# Patient Record
Sex: MALE | Race: WHITE | NOT HISPANIC OR LATINO | Employment: OTHER | URBAN - METROPOLITAN AREA
[De-identification: names, ages, dates, MRNs, and addresses within clinical notes are randomized per-mention and may not be internally consistent; named-entity substitution may affect disease eponyms.]

---

## 2021-10-14 ENCOUNTER — APPOINTMENT (OUTPATIENT)
Dept: PREADMISSION TESTING | Facility: HOSPITAL | Age: 76
End: 2021-10-14

## 2021-11-08 ENCOUNTER — PRE-ADMISSION TESTING (OUTPATIENT)
Dept: PREADMISSION TESTING | Facility: HOSPITAL | Age: 76
End: 2021-11-08

## 2021-11-08 ENCOUNTER — HOSPITAL ENCOUNTER (OUTPATIENT)
Dept: GENERAL RADIOLOGY | Facility: HOSPITAL | Age: 76
Discharge: HOME | End: 2021-11-08

## 2021-11-08 VITALS
HEART RATE: 61 BPM | HEIGHT: 70 IN | BODY MASS INDEX: 26.92 KG/M2 | DIASTOLIC BLOOD PRESSURE: 74 MMHG | TEMPERATURE: 97.9 F | WEIGHT: 188 LBS | SYSTOLIC BLOOD PRESSURE: 155 MMHG | OXYGEN SATURATION: 100 % | RESPIRATION RATE: 20 BRPM

## 2021-11-08 LAB
APTT PPP: 31.7 SECONDS (ref 22.7–35.4)
BILIRUB UR QL STRIP: NEGATIVE
CLARITY UR: CLEAR
COLOR UR: YELLOW
DEPRECATED RDW RBC AUTO: 45 FL (ref 37–54)
ERYTHROCYTE [DISTWIDTH] IN BLOOD BY AUTOMATED COUNT: 13.3 % (ref 12.3–15.4)
GLUCOSE UR STRIP-MCNC: NEGATIVE MG/DL
HCT VFR BLD AUTO: 43.8 % (ref 37.5–51)
HGB BLD-MCNC: 14.4 G/DL (ref 13–17.7)
HGB UR QL STRIP.AUTO: NEGATIVE
INR PPP: 1.07 (ref 0.9–1.1)
KETONES UR QL STRIP: NEGATIVE
LEUKOCYTE ESTERASE UR QL STRIP.AUTO: NEGATIVE
MCH RBC QN AUTO: 30.2 PG (ref 26.6–33)
MCHC RBC AUTO-ENTMCNC: 32.9 G/DL (ref 31.5–35.7)
MCV RBC AUTO: 91.8 FL (ref 79–97)
NITRITE UR QL STRIP: NEGATIVE
PH UR STRIP.AUTO: 5.5 [PH] (ref 5–8)
PLATELET # BLD AUTO: 223 10*3/MM3 (ref 140–450)
PMV BLD AUTO: 10.2 FL (ref 6–12)
PROT UR QL STRIP: NEGATIVE
PROTHROMBIN TIME: 13.8 SECONDS (ref 11.7–14.2)
QT INTERVAL: 401 MS
RBC # BLD AUTO: 4.77 10*6/MM3 (ref 4.14–5.8)
SARS-COV-2 ORF1AB RESP QL NAA+PROBE: NOT DETECTED
SP GR UR STRIP: 1.02 (ref 1–1.03)
UROBILINOGEN UR QL STRIP: NORMAL
WBC # BLD AUTO: 4.85 10*3/MM3 (ref 3.4–10.8)

## 2021-11-08 PROCEDURE — 85730 THROMBOPLASTIN TIME PARTIAL: CPT

## 2021-11-08 PROCEDURE — U0004 COV-19 TEST NON-CDC HGH THRU: HCPCS | Performed by: NURSE PRACTITIONER

## 2021-11-08 PROCEDURE — 93005 ELECTROCARDIOGRAM TRACING: CPT

## 2021-11-08 PROCEDURE — C9803 HOPD COVID-19 SPEC COLLECT: HCPCS | Performed by: NURSE PRACTITIONER

## 2021-11-08 PROCEDURE — 85610 PROTHROMBIN TIME: CPT

## 2021-11-08 PROCEDURE — 71046 X-RAY EXAM CHEST 2 VIEWS: CPT

## 2021-11-08 PROCEDURE — 85027 COMPLETE CBC AUTOMATED: CPT

## 2021-11-08 PROCEDURE — 36415 COLL VENOUS BLD VENIPUNCTURE: CPT

## 2021-11-08 PROCEDURE — 81003 URINALYSIS AUTO W/O SCOPE: CPT

## 2021-11-08 PROCEDURE — 93010 ELECTROCARDIOGRAM REPORT: CPT | Performed by: INTERNAL MEDICINE

## 2021-11-08 RX ORDER — MELOXICAM 15 MG/1
15 TABLET ORAL AS NEEDED
COMMUNITY

## 2021-11-08 RX ORDER — CETIRIZINE HYDROCHLORIDE 5 MG/1
5 TABLET ORAL AS NEEDED
COMMUNITY

## 2021-11-08 RX ORDER — GENTAMICIN SULFATE 3 MG/ML
3 SOLUTION/ DROPS OPHTHALMIC 2 TIMES DAILY
COMMUNITY
Start: 2021-10-11

## 2021-11-08 RX ORDER — ASPIRIN 81 MG/1
81 TABLET ORAL NIGHTLY
COMMUNITY

## 2021-11-08 RX ORDER — SIMVASTATIN 40 MG
40 TABLET ORAL NIGHTLY
COMMUNITY

## 2021-11-08 NOTE — DISCHARGE INSTRUCTIONS
Take the following medications the morning of surgery:  NONE    ARRIVE TO OUTPATIENT SURGERY 11-10-21 AT 9:00AM      If you are on prescription narcotic pain medication to control your pain you may also take that medication the morning of surgery.    General Instructions:  • Do not eat solid food after midnight the night before surgery.  • You may drink clear liquids day of surgery but must stop at least one hour before your hospital arrival time.  • It is beneficial for you to have a clear drink that contains carbohydrates the day of surgery.  We suggest a 12 to 20 ounce bottle of Gatorade or Powerade for non-diabetic patients or a 12 to 20 ounce bottle of G2 or Powerade Zero for diabetic patients. (Pediatric patients, are not advised to drink a 12 to 20 ounce carbohydrate drink)    Clear liquids are liquids you can see through.  Nothing red in color.     Plain water                               Sports drinks  Sodas                                   Gelatin (Jell-O)  Fruit juices without pulp such as white grape juice and apple juice  Popsicles that contain no fruit or yogurt  Tea or coffee (no cream or milk added)  Gatorade / Powerade  G2 / Powerade Zero    • Infants may have breast milk up to four hours before surgery.  • Infants drinking formula may drink formula up to six hours before surgery.   • Patients who avoid smoking, chewing tobacco and alcohol for 4 weeks prior to surgery have a reduced risk of post-operative complications.  Quit smoking as many days before surgery as you can.  • Do not smoke, use chewing tobacco or drink alcohol the day of surgery.   • If applicable bring your C-PAP/ BI-PAP machine.  • Bring any papers given to you in the doctor’s office.  • Wear clean comfortable clothes.  • Do not wear contact lenses, false eyelashes or make-up.  Bring a case for your glasses.   • Bring crutches or walker if applicable.  • Remove all piercings.  Leave jewelry and any other valuables at  home.  • Hair extensions with metal clips must be removed prior to surgery.  • The Pre-Admission Testing nurse will instruct you to bring medications if unable to obtain an accurate list in Pre-Admission Testing.      Preventing a Surgical Site Infection:  • For 2 to 3 days before surgery, avoid shaving with a razor because the razor can irritate skin and make it easier to develop an infection.    • Any areas of open skin can increase the risk of a post-operative wound infection by allowing bacteria to enter and travel throughout the body.  Notify your surgeon if you have any skin wounds / rashes even if it is not near the expected surgical site.  The area will need assessed to determine if surgery should be delayed until it is healed.  • The night prior to surgery shower using a fresh bar of anti-bacterial soap (such as Dial) and clean washcloth.  Sleep in a clean bed with clean clothing.  Do not allow pets to sleep with you.  • Shower on the morning of surgery using a fresh bar of anti-bacterial soap (such as Dial) and clean washcloth.  Dry with a clean towel and dress in clean clothing.  • Ask your surgeon if you will be receiving antibiotics prior to surgery.  • Make sure you, your family, and all healthcare providers clean their hands with soap and water or an alcohol based hand  before caring for you or your wound.    Day of surgery:  Your arrival time is approximately two hours before your scheduled surgery time.  Upon arrival, a Pre-op nurse and Anesthesiologist will review your health history, obtain vital signs, and answer questions you may have.  The only belongings needed at this time will be a list of your home medications and if applicable your C-PAP/BI-PAP machine.  A Pre-op nurse will start an IV and you may receive medication in preparation for surgery, including something to help you relax.     Please be aware that surgery does come with discomfort.  We want to make every effort to control  your discomfort so please discuss any uncontrolled symptoms with your nurse.   Your doctor will most likely have prescribed pain medications.      If you are going home after surgery you will receive individualized written care instructions before being discharged.  A responsible adult must drive you to and from the hospital on the day of your surgery and stay with you for 24 hours.  Discharge prescriptions can be filled by the hospital pharmacy during regular pharmacy hours.  If you are having surgery late in the day/evening your prescription may be e-prescribed to your pharmacy.  Please verify your pharmacy hours or chose a 24 hour pharmacy to avoid not having access to your prescription because your pharmacy has closed for the day.    If you are staying overnight following surgery, you will be transported to your hospital room following the recovery period.  [facility] has all private rooms.    If you have any questions please call Pre-Admission Testing at [phone].  Deductibles and co-payments are collected on the day of service. Please be prepared to pay the required co-pay, deductible or deposit on the day of service as defined by your plan.    Patient Education for Self-Quarantine Process    • Following your COVID testing, we strongly recommend that you wear a mask when you are with other people and practice social distancing.   • Limit your activities to only required outings.  • Wash your hands with soap and water frequently for at least 20 seconds.   • Avoid touching your eyes, nose and mouth with unwashed hands.  • Do not share anything - utensils, drinking glasses, food from the same bowl.   • Sanitize household surfaces daily. Include all high touch areas (door handles, light switches, phones, countertops, etc.)    Call your surgeon immediately if you experience any of the following symptoms:  • Sore Throat  • Shortness of Breath or difficulty breathing  • Cough  • Chills  • Body  soreness or muscle pain  • Headache  • Fever  • New loss of taste or smell  • Do not arrive for your surgery ill.  Your procedure will need to be rescheduled to another time.  You will need to call your physician before the day of surgery to avoid any unnecessary exposure to hospital staff as well as other patients.

## 2021-11-10 ENCOUNTER — HOSPITAL ENCOUNTER (OUTPATIENT)
Facility: HOSPITAL | Age: 76
Setting detail: HOSPITAL OUTPATIENT SURGERY
Discharge: HOME | End: 2021-11-10
Attending: OTOLARYNGOLOGY | Admitting: OTOLARYNGOLOGY

## 2021-11-10 ENCOUNTER — ANESTHESIA (OUTPATIENT)
Dept: PERIOP | Facility: HOSPITAL | Age: 76
End: 2021-11-10

## 2021-11-10 ENCOUNTER — ANESTHESIA EVENT (OUTPATIENT)
Dept: PERIOP | Facility: HOSPITAL | Age: 76
End: 2021-11-10

## 2021-11-10 VITALS
TEMPERATURE: 97.4 F | HEART RATE: 67 BPM | OXYGEN SATURATION: 98 % | DIASTOLIC BLOOD PRESSURE: 70 MMHG | SYSTOLIC BLOOD PRESSURE: 140 MMHG | RESPIRATION RATE: 16 BRPM

## 2021-11-10 DIAGNOSIS — H61.21 EXCESSIVE EAR WAX, RIGHT: ICD-10-CM

## 2021-11-10 PROCEDURE — 87070 CULTURE OTHR SPECIMN AEROBIC: CPT | Performed by: OTOLARYNGOLOGY

## 2021-11-10 PROCEDURE — 25010000002 ONDANSETRON PER 1 MG: Performed by: NURSE ANESTHETIST, CERTIFIED REGISTERED

## 2021-11-10 PROCEDURE — 0 EPINEPHRINE 1 MG/ML SOLUTION: Performed by: OTOLARYNGOLOGY

## 2021-11-10 PROCEDURE — 87075 CULTR BACTERIA EXCEPT BLOOD: CPT | Performed by: OTOLARYNGOLOGY

## 2021-11-10 PROCEDURE — 25010000002 SUCCINYLCHOLINE PER 20 MG: Performed by: NURSE ANESTHETIST, CERTIFIED REGISTERED

## 2021-11-10 PROCEDURE — 25010000002 PROPOFOL 10 MG/ML EMULSION: Performed by: NURSE ANESTHETIST, CERTIFIED REGISTERED

## 2021-11-10 PROCEDURE — 25010000002 FENTANYL CITRATE (PF) 50 MCG/ML SOLUTION: Performed by: NURSE ANESTHETIST, CERTIFIED REGISTERED

## 2021-11-10 PROCEDURE — 87205 SMEAR GRAM STAIN: CPT | Performed by: OTOLARYNGOLOGY

## 2021-11-10 PROCEDURE — 25010000002 DEXAMETHASONE PER 1 MG: Performed by: NURSE ANESTHETIST, CERTIFIED REGISTERED

## 2021-11-10 RX ORDER — PROPOFOL 10 MG/ML
VIAL (ML) INTRAVENOUS AS NEEDED
Status: DISCONTINUED | OUTPATIENT
Start: 2021-11-10 | End: 2021-11-10 | Stop reason: SURG

## 2021-11-10 RX ORDER — SODIUM CHLORIDE, SODIUM LACTATE, POTASSIUM CHLORIDE, CALCIUM CHLORIDE 600; 310; 30; 20 MG/100ML; MG/100ML; MG/100ML; MG/100ML
9 INJECTION, SOLUTION INTRAVENOUS CONTINUOUS
Status: DISCONTINUED | OUTPATIENT
Start: 2021-11-10 | End: 2021-11-10 | Stop reason: HOSPADM

## 2021-11-10 RX ORDER — HYDROMORPHONE HYDROCHLORIDE 1 MG/ML
0.5 INJECTION, SOLUTION INTRAMUSCULAR; INTRAVENOUS; SUBCUTANEOUS
Status: DISCONTINUED | OUTPATIENT
Start: 2021-11-10 | End: 2021-11-10 | Stop reason: HOSPADM

## 2021-11-10 RX ORDER — FENTANYL CITRATE 50 UG/ML
INJECTION, SOLUTION INTRAMUSCULAR; INTRAVENOUS AS NEEDED
Status: DISCONTINUED | OUTPATIENT
Start: 2021-11-10 | End: 2021-11-10 | Stop reason: SURG

## 2021-11-10 RX ORDER — ONDANSETRON 2 MG/ML
INJECTION INTRAMUSCULAR; INTRAVENOUS AS NEEDED
Status: DISCONTINUED | OUTPATIENT
Start: 2021-11-10 | End: 2021-11-10 | Stop reason: SURG

## 2021-11-10 RX ORDER — OXYCODONE AND ACETAMINOPHEN 10; 325 MG/1; MG/1
1 TABLET ORAL EVERY 4 HOURS PRN
Status: DISCONTINUED | OUTPATIENT
Start: 2021-11-10 | End: 2021-11-10 | Stop reason: HOSPADM

## 2021-11-10 RX ORDER — EPHEDRINE SULFATE 50 MG/ML
INJECTION, SOLUTION INTRAVENOUS AS NEEDED
Status: DISCONTINUED | OUTPATIENT
Start: 2021-11-10 | End: 2021-11-10 | Stop reason: SURG

## 2021-11-10 RX ORDER — HYDROCODONE BITARTRATE AND ACETAMINOPHEN 7.5; 325 MG/1; MG/1
1 TABLET ORAL ONCE AS NEEDED
Status: DISCONTINUED | OUTPATIENT
Start: 2021-11-10 | End: 2021-11-10 | Stop reason: HOSPADM

## 2021-11-10 RX ORDER — DIPHENHYDRAMINE HCL 25 MG
25 CAPSULE ORAL
Status: DISCONTINUED | OUTPATIENT
Start: 2021-11-10 | End: 2021-11-10 | Stop reason: HOSPADM

## 2021-11-10 RX ORDER — PROMETHAZINE HYDROCHLORIDE 25 MG/1
25 SUPPOSITORY RECTAL ONCE AS NEEDED
Status: DISCONTINUED | OUTPATIENT
Start: 2021-11-10 | End: 2021-11-10 | Stop reason: HOSPADM

## 2021-11-10 RX ORDER — MAGNESIUM HYDROXIDE 1200 MG/15ML
LIQUID ORAL AS NEEDED
Status: DISCONTINUED | OUTPATIENT
Start: 2021-11-10 | End: 2021-11-10 | Stop reason: HOSPADM

## 2021-11-10 RX ORDER — LIDOCAINE HYDROCHLORIDE 20 MG/ML
INJECTION, SOLUTION INFILTRATION; PERINEURAL AS NEEDED
Status: DISCONTINUED | OUTPATIENT
Start: 2021-11-10 | End: 2021-11-10 | Stop reason: SURG

## 2021-11-10 RX ORDER — EPHEDRINE SULFATE 50 MG/ML
5 INJECTION, SOLUTION INTRAVENOUS ONCE AS NEEDED
Status: DISCONTINUED | OUTPATIENT
Start: 2021-11-10 | End: 2021-11-10 | Stop reason: HOSPADM

## 2021-11-10 RX ORDER — LABETALOL HYDROCHLORIDE 5 MG/ML
5 INJECTION, SOLUTION INTRAVENOUS
Status: DISCONTINUED | OUTPATIENT
Start: 2021-11-10 | End: 2021-11-10 | Stop reason: HOSPADM

## 2021-11-10 RX ORDER — HYDROCODONE BITARTRATE AND ACETAMINOPHEN 5; 325 MG/1; MG/1
1 TABLET ORAL ONCE AS NEEDED
Status: DISCONTINUED | OUTPATIENT
Start: 2021-11-10 | End: 2021-11-10 | Stop reason: HOSPADM

## 2021-11-10 RX ORDER — ONDANSETRON 2 MG/ML
4 INJECTION INTRAMUSCULAR; INTRAVENOUS ONCE AS NEEDED
Status: DISCONTINUED | OUTPATIENT
Start: 2021-11-10 | End: 2021-11-10 | Stop reason: HOSPADM

## 2021-11-10 RX ORDER — ACETAMINOPHEN 650 MG/1
650 SUPPOSITORY RECTAL ONCE AS NEEDED
Status: DISCONTINUED | OUTPATIENT
Start: 2021-11-10 | End: 2021-11-10 | Stop reason: HOSPADM

## 2021-11-10 RX ORDER — SODIUM CHLORIDE 0.9 % (FLUSH) 0.9 %
3 SYRINGE (ML) INJECTION EVERY 12 HOURS SCHEDULED
Status: DISCONTINUED | OUTPATIENT
Start: 2021-11-10 | End: 2021-11-10 | Stop reason: HOSPADM

## 2021-11-10 RX ORDER — SULFAMETHOXAZOLE AND TRIMETHOPRIM 800; 160 MG/1; MG/1
1 TABLET ORAL 2 TIMES DAILY
Qty: 20 TABLET | Refills: 0 | Status: SHIPPED | OUTPATIENT
Start: 2021-11-10 | End: 2021-11-20

## 2021-11-10 RX ORDER — SODIUM CHLORIDE 0.9 % (FLUSH) 0.9 %
3-10 SYRINGE (ML) INJECTION AS NEEDED
Status: DISCONTINUED | OUTPATIENT
Start: 2021-11-10 | End: 2021-11-10 | Stop reason: HOSPADM

## 2021-11-10 RX ORDER — PROMETHAZINE HYDROCHLORIDE 25 MG/1
25 TABLET ORAL ONCE AS NEEDED
Status: DISCONTINUED | OUTPATIENT
Start: 2021-11-10 | End: 2021-11-10 | Stop reason: HOSPADM

## 2021-11-10 RX ORDER — LIDOCAINE HYDROCHLORIDE 10 MG/ML
0.5 INJECTION, SOLUTION EPIDURAL; INFILTRATION; INTRACAUDAL; PERINEURAL ONCE AS NEEDED
Status: COMPLETED | OUTPATIENT
Start: 2021-11-10 | End: 2021-11-10

## 2021-11-10 RX ORDER — DIPHENHYDRAMINE HYDROCHLORIDE 50 MG/ML
12.5 INJECTION INTRAMUSCULAR; INTRAVENOUS
Status: DISCONTINUED | OUTPATIENT
Start: 2021-11-10 | End: 2021-11-10 | Stop reason: HOSPADM

## 2021-11-10 RX ORDER — FLUMAZENIL 0.1 MG/ML
0.2 INJECTION INTRAVENOUS AS NEEDED
Status: DISCONTINUED | OUTPATIENT
Start: 2021-11-10 | End: 2021-11-10 | Stop reason: HOSPADM

## 2021-11-10 RX ORDER — SUCCINYLCHOLINE CHLORIDE 20 MG/ML
INJECTION INTRAMUSCULAR; INTRAVENOUS AS NEEDED
Status: DISCONTINUED | OUTPATIENT
Start: 2021-11-10 | End: 2021-11-10 | Stop reason: SURG

## 2021-11-10 RX ORDER — HYDROCODONE BITARTRATE AND ACETAMINOPHEN 5; 325 MG/1; MG/1
1 TABLET ORAL EVERY 4 HOURS PRN
Qty: 18 TABLET | Refills: 0 | Status: SHIPPED | OUTPATIENT
Start: 2021-11-10

## 2021-11-10 RX ORDER — FENTANYL CITRATE 50 UG/ML
50 INJECTION, SOLUTION INTRAMUSCULAR; INTRAVENOUS
Status: DISCONTINUED | OUTPATIENT
Start: 2021-11-10 | End: 2021-11-10 | Stop reason: HOSPADM

## 2021-11-10 RX ORDER — NALOXONE HCL 0.4 MG/ML
0.2 VIAL (ML) INJECTION AS NEEDED
Status: DISCONTINUED | OUTPATIENT
Start: 2021-11-10 | End: 2021-11-10 | Stop reason: HOSPADM

## 2021-11-10 RX ORDER — ACETAMINOPHEN 325 MG/1
650 TABLET ORAL ONCE AS NEEDED
Status: DISCONTINUED | OUTPATIENT
Start: 2021-11-10 | End: 2021-11-10 | Stop reason: HOSPADM

## 2021-11-10 RX ORDER — DEXAMETHASONE SODIUM PHOSPHATE 10 MG/ML
INJECTION INTRAMUSCULAR; INTRAVENOUS AS NEEDED
Status: DISCONTINUED | OUTPATIENT
Start: 2021-11-10 | End: 2021-11-10 | Stop reason: SURG

## 2021-11-10 RX ORDER — HYDRALAZINE HYDROCHLORIDE 20 MG/ML
5 INJECTION INTRAMUSCULAR; INTRAVENOUS
Status: DISCONTINUED | OUTPATIENT
Start: 2021-11-10 | End: 2021-11-10 | Stop reason: HOSPADM

## 2021-11-10 RX ADMIN — EPHEDRINE SULFATE 10 MG: 50 INJECTION INTRAVENOUS at 11:25

## 2021-11-10 RX ADMIN — EPHEDRINE SULFATE 10 MG: 50 INJECTION INTRAVENOUS at 11:27

## 2021-11-10 RX ADMIN — FENTANYL CITRATE 50 MCG: 50 INJECTION INTRAMUSCULAR; INTRAVENOUS at 11:13

## 2021-11-10 RX ADMIN — ONDANSETRON 4 MG: 2 INJECTION INTRAMUSCULAR; INTRAVENOUS at 11:32

## 2021-11-10 RX ADMIN — PROPOFOL 50 MG: 10 INJECTION, EMULSION INTRAVENOUS at 11:13

## 2021-11-10 RX ADMIN — LIDOCAINE HYDROCHLORIDE 0.5 ML: 10 INJECTION, SOLUTION EPIDURAL; INFILTRATION; INTRACAUDAL; PERINEURAL at 09:55

## 2021-11-10 RX ADMIN — SODIUM CHLORIDE, POTASSIUM CHLORIDE, SODIUM LACTATE AND CALCIUM CHLORIDE 9 ML/HR: 600; 310; 30; 20 INJECTION, SOLUTION INTRAVENOUS at 09:55

## 2021-11-10 RX ADMIN — SUCCINYLCHOLINE CHLORIDE 120 MG: 20 INJECTION, SOLUTION INTRAMUSCULAR; INTRAVENOUS; PARENTERAL at 11:13

## 2021-11-10 RX ADMIN — FENTANYL CITRATE 50 MCG: 50 INJECTION INTRAMUSCULAR; INTRAVENOUS at 12:12

## 2021-11-10 RX ADMIN — DEXAMETHASONE SODIUM PHOSPHATE 8 MG: 10 INJECTION INTRAMUSCULAR; INTRAVENOUS at 11:25

## 2021-11-10 RX ADMIN — PROPOFOL 150 MG: 10 INJECTION, EMULSION INTRAVENOUS at 11:04

## 2021-11-10 RX ADMIN — LIDOCAINE HYDROCHLORIDE 60 MG: 20 INJECTION, SOLUTION INFILTRATION; PERINEURAL at 11:04

## 2021-11-10 NOTE — OP NOTE
[facility] OPERATIVE NOTE  11/10/2021    NAME: Wale Archer    YOB: 1945  MRN: [id number]    PRE-OPERATIVE DIAGNOSIS: Chronic Bilateral External Otitis with Granulation Tissue and Perforation Left Ear    POST-OPERATIVE DIAGNOSIS:  same    PROCEDURE PERFORMED: Examination of both ears under incision with possible cultures and removal of granulation tissue left ear    SURGEON: Kash Weinstein MD    ASSISTANT(S): None    ANESTHESIA: General Anesthesia via Endotracheal Tube    INDICATIONS: The patient is a 76 y.o. male with chronic external otitis bilaterally with granulation tissue left ear.  This is been refractory to medical management.    PROCEDURE:  The patient was brought to the operating room, given General Anesthesia via Endotracheal Tube, and prepped and draped in the usual manner.     The right ear was done first.  Wax and purulent material were removed.  Culture was taken from the side.    Culture was taken in the left ear as well.  Granulation tissue was surrounding a perforation of the drum.  This was carefully removed.  Hemostasis achieved with topical adrenaline and light application of silver nitrate.    The patient tolerated the procedure well and was returned to the recovery room in satisfactory condition.    SPECIMENS: None    COMPLICATIONS: NONE    ESTIMATED BLOOD LOSS: < 5cc    Kash Weinstein MD  11/10/2021

## 2021-11-10 NOTE — ANESTHESIA PREPROCEDURE EVALUATION
Anesthesia Evaluation     Patient summary reviewed and Nursing notes reviewed   no history of anesthetic complications:  NPO Solid Status: > 8 hours  NPO Liquid Status: > 2 hours           Airway   Mallampati: II  TM distance: >3 FB  Neck ROM: full  No difficulty expected  Dental      Pulmonary    Cardiovascular   Exercise tolerance: good (4-7 METS)    (+) hyperlipidemia,       Neuro/Psych  GI/Hepatic/Renal/Endo      Musculoskeletal     Abdominal    Substance History      OB/GYN          Other                        Anesthesia Plan    ASA 1     general     intravenous induction     Anesthetic plan, all risks, benefits, and alternatives have been provided, discussed and informed consent has been obtained with: patient.

## 2021-11-10 NOTE — DISCHARGE INSTRUCTIONS
Right ear had ear wax and purulent material removed.  Left ear lots of granulation tissue was removed and cauterized.    Antibiotic and Pain medicine was sent to patients pharmacy for you to  today  Please use same drops you have at home.  Try to leave hearing aids out, and follow-up with Dr Weinstein next week.

## 2021-11-10 NOTE — ANESTHESIA PROCEDURE NOTES
Airway  Urgency: elective    Date/Time: 11/10/2021 11:05 AM    General Information and Staff    Patient location during procedure: OR  Anesthesiologist: Fern Francisco MD  CRNA: Marissa Sanders CRNA    Indications and Patient Condition  Indications for airway management: airway protection    Preoxygenated: yes  Mask difficulty assessment: 2 - vent by mask + OA or adjuvant +/- NMBA    Final Airway Details  Final airway type: endotracheal airway      Successful airway: ETT  Cuffed: yes   Successful intubation technique: direct laryngoscopy  Facilitating devices/methods: Bougie and cricoid pressure  Endotracheal tube insertion site: oral  Blade: Nolan  Blade size: 4  ETT size (mm): 8.0  Cormack-Lehane Classification: grade IIb - view of arytenoids or posterior of glottis only  Placement verified by: capnometry   Measured from: lips  ETT/EBT  to lips (cm): 22  Number of attempts at approach: 1  Assessment: lips, teeth, and gum same as pre-op and atraumatic intubation    Additional Comments  Size 4 flexible LMA appeared to seal at first, then large leak noted. Decision made to intubate. Chip in front L tooth noted prior to LMA placement/DL. No noted change to dentition

## 2021-11-10 NOTE — ANESTHESIA POSTPROCEDURE EVALUATION
Patient: Wale Archer    Procedure Summary     Date: 11/10/21 Room / Location:  [facility]  /  [facility]    Anesthesia Start: 1059 Anesthesia Stop: 1217    Procedure: TYMPANOLYSIS/EXAMINE UNDER ANESTHESIA BILATERAL  EAR (Bilateral Ear) Diagnosis:     Surgeons: Kash Weinstein MD Provider: Fern Francisco MD    Anesthesia Type: general ASA Status: 1          Anesthesia Type: general    Vitals  Vitals Value Taken Time   /70 11/10/21 1246   Temp 36.3 °C (97.4 °F) 11/10/21 1215   Pulse 68 11/10/21 1259   Resp 16 11/10/21 1245   SpO2 99 % 11/10/21 1300   Vitals shown include unvalidated device data.        Post Anesthesia Care and Evaluation    Patient location during evaluation: bedside  Patient participation: complete - patient participated  Level of consciousness: awake and alert  Pain management: adequate  Airway patency: patent  Anesthetic complications: No anesthetic complications  PONV Status: controlled  Cardiovascular status: acceptable  Respiratory status: acceptable  Hydration status: acceptable

## 2021-11-13 LAB
BACTERIA SPEC AEROBE CULT: ABNORMAL
GRAM STN SPEC: ABNORMAL

## 2021-11-15 LAB — BACTERIA SPEC ANAEROBE CULT: NORMAL

## 2021-11-17 LAB — BACTERIA SPEC ANAEROBE CULT: NORMAL

## (undated) DEVICE — 1 ML TUBERCULIN SYRINGE REGULAR TIP: Brand: MONOJECT

## (undated) DEVICE — STERILE COTTON BALLS LARGE 5/P: Brand: MEDLINE

## (undated) DEVICE — COLLECTOR 1419010 JUHN TYM-TAP 10PK: Brand: JUHN TYM-TAP®

## (undated) DEVICE — TOWEL,OR,DSP,ST,BLUE,STD,4/PK,20PK/CS: Brand: MEDLINE

## (undated) DEVICE — WIPE INST MEROCEL

## (undated) DEVICE — SYRINGE,EAR/ULCER, RED, 2 OZ, STERILE: Brand: MEDLINE

## (undated) DEVICE — DRP MICROSCP 20X64

## (undated) DEVICE — #1020 STERI DRAPE 41MM X 41MM SMALL: Brand: STERI-DRAPE™

## (undated) DEVICE — PK ENT MINOR 40

## (undated) DEVICE — TRAP FLD MINIVAC MEGADYNE 100ML

## (undated) DEVICE — GLV SURG PREMIERPRO ORTHO LTX PF SZ7 BRN

## (undated) DEVICE — Device

## (undated) DEVICE — UNDYED MONOFILAMENT (POLYDIOXANONE), ABSORBABLE SURGICAL SUTURE: Brand: PDS

## (undated) DEVICE — SUT VIC 4/0 P3 18IN J494G

## (undated) DEVICE — EXTENSION SET, MALE LUER LOCK ADAPTER WITH RETRACTABLE COLLAR

## (undated) DEVICE — STANDARD HYPODERMIC NEEDLE,POLYPROPYLENE HUB: Brand: MONOJECT